# Patient Record
Sex: MALE | Race: WHITE
[De-identification: names, ages, dates, MRNs, and addresses within clinical notes are randomized per-mention and may not be internally consistent; named-entity substitution may affect disease eponyms.]

---
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
## 2024-02-14 NOTE — HP
Problem: Adult Inpatient Plan of Care  Goal: Plan of Care Review  Outcome: Ongoing, Progressing     Problem: Adult Inpatient Plan of Care  Goal: Absence of Hospital-Acquired Illness or Injury  Intervention: Identify and Manage Fall Risk  Recent Flowsheet Documentation  Taken 2/13/2024 1800 by Ayde Tyler RN  Safety Promotion/Fall Prevention:   safety round/check completed   fall prevention program maintained   activity supervised   assistive device/personal items within reach   nonskid shoes/slippers when out of bed  Intervention: Prevent Skin Injury  Recent Flowsheet Documentation  Taken 2/13/2024 1800 by Ayde Tyler RN  Body Position: position changed independently     Problem: Asthma Comorbidity  Goal: Maintenance of Asthma Control  Intervention: Maintain Asthma Symptom Control  Recent Flowsheet Documentation  Taken 2/13/2024 1800 by Ayde Tyler RN  Medication Review/Management: medications reviewed     Problem: Behavioral Health Comorbidity  Goal: Maintenance of Behavioral Health Symptom Control  Intervention: Maintain Behavioral Health Symptom Control  Recent Flowsheet Documentation  Taken 2/13/2024 1800 by Ayde Tyler RN  Medication Review/Management: medications reviewed     Problem: COPD (Chronic Obstructive Pulmonary Disease) Comorbidity  Goal: Maintenance of COPD Symptom Control  Intervention: Maintain COPD-Symptom Control  Recent Flowsheet Documentation  Taken 2/13/2024 1800 by Ayde Tyler RN  Medication Review/Management: medications reviewed     Problem: Heart Failure Comorbidity  Goal: Maintenance of Heart Failure Symptom Control  Intervention: Maintain Heart Failure-Management  Recent Flowsheet Documentation  Taken 2/13/2024 1800 by Ayde Tyler RN  Medication Review/Management: medications reviewed     Problem: Hypertension Comorbidity  Goal: Blood Pressure in Desired Range  Intervention: Maintain Blood Pressure Management  Recent Flowsheet Documentation  Taken 2/13/2024 1800  Satellite [facility]





- Chief Complaint


Chief Complaint: left wrist ganglion cyst


History of Present Illness: left wrist ganglion cyst


History Source: Patient


Limitations to Obtaining History: No Limitations





- Past Medical History


Allergies/Adverse Reactions: 


 Allergies











Allergy/AdvReac Type Severity Reaction Status Date / Time


 


prednisone Allergy Severe BLURRED Verified 12/04/18 15:09





   VISION;PROMLEMS  





   WITH VISION  


 


LIMA BEANS Allergy Severe Hives Uncoded 12/04/18 15:08














- Current Medications


Current Medications: 


 Home Medications











 Medication  Instructions  Recorded


 


Dexmethylphenidate HCl 10 mg PO ASDIR PRN 12/04/18














Satellite Physical Exam





- Physical Examination


General Appearance: Well Nourished


ENT: Clear


Lung: Clear to auscultation


Heart: Regular rate & rhythm


Breasts: Soft


Abdomen: Soft


Extremities: No edema





Satellite Impression/Plan





- Impression/Plan


Impression: left wrist volar ganglion cyst


Operative Procedure: excision mass left wrist


Date to be Performed: 12/12/18 by Ayde Tyler RN  Medication Review/Management: medications reviewed     Problem: Osteoarthritis Comorbidity  Goal: Maintenance of Osteoarthritis Symptom Control  Intervention: Maintain Osteoarthritis Symptom Control  Recent Flowsheet Documentation  Taken 2/13/2024 1800 by Ayde Tyler RN  Medication Review/Management: medications reviewed     Problem: Pain Chronic (Persistent) (Comorbidity Management)  Goal: Acceptable Pain Control and Functional Ability  Intervention: Manage Persistent Pain  Recent Flowsheet Documentation  Taken 2/13/2024 1800 by Ayde Tyler RN  Medication Review/Management: medications reviewed  Intervention: Optimize Psychosocial Wellbeing  Recent Flowsheet Documentation  Taken 2/13/2024 1800 by Ayde Tyler RN  Diversional Activities: television     Problem: Fall Injury Risk  Goal: Absence of Fall and Fall-Related Injury  Intervention: Identify and Manage Contributors  Recent Flowsheet Documentation  Taken 2/13/2024 1800 by Adye Tyler RN  Medication Review/Management: medications reviewed  Intervention: Promote Injury-Free Environment  Recent Flowsheet Documentation  Taken 2/13/2024 1800 by Ayde Tyler RN  Safety Promotion/Fall Prevention:   safety round/check completed   fall prevention program maintained   activity supervised   assistive device/personal items within reach   nonskid shoes/slippers when out of bed   Goal Outcome Evaluation: